# Patient Record
Sex: FEMALE
[De-identification: names, ages, dates, MRNs, and addresses within clinical notes are randomized per-mention and may not be internally consistent; named-entity substitution may affect disease eponyms.]

---

## 2018-04-16 ENCOUNTER — HOSPITAL ENCOUNTER (INPATIENT)
Dept: HOSPITAL 5 - ED | Age: 63
LOS: 2 days | Discharge: OTHER INSTITUTION | DRG: 871 | End: 2018-04-18
Attending: INTERNAL MEDICINE | Admitting: INTERNAL MEDICINE
Payer: MEDICAID

## 2018-04-16 DIAGNOSIS — I48.91: ICD-10-CM

## 2018-04-16 DIAGNOSIS — I50.9: ICD-10-CM

## 2018-04-16 DIAGNOSIS — A41.9: Primary | ICD-10-CM

## 2018-04-16 DIAGNOSIS — L02.211: ICD-10-CM

## 2018-04-16 DIAGNOSIS — E66.01: ICD-10-CM

## 2018-04-16 DIAGNOSIS — E87.1: ICD-10-CM

## 2018-04-16 DIAGNOSIS — I11.0: ICD-10-CM

## 2018-04-16 DIAGNOSIS — N39.0: ICD-10-CM

## 2018-04-16 DIAGNOSIS — D64.9: ICD-10-CM

## 2018-04-16 DIAGNOSIS — Z85.89: ICD-10-CM

## 2018-04-16 DIAGNOSIS — Z86.14: ICD-10-CM

## 2018-04-16 DIAGNOSIS — R65.21: ICD-10-CM

## 2018-04-16 DIAGNOSIS — Z92.21: ICD-10-CM

## 2018-04-16 DIAGNOSIS — Z92.3: ICD-10-CM
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
## 2018-04-16 DIAGNOSIS — G47.33: ICD-10-CM

## 2018-04-16 PROCEDURE — 81001 URINALYSIS AUTO W/SCOPE: CPT

## 2018-04-16 PROCEDURE — 87116 MYCOBACTERIA CULTURE: CPT

## 2018-04-16 PROCEDURE — 86920 COMPATIBILITY TEST SPIN: CPT

## 2018-04-16 PROCEDURE — 93010 ELECTROCARDIOGRAM REPORT: CPT

## 2018-04-16 PROCEDURE — 82270 OCCULT BLOOD FECES: CPT

## 2018-04-16 PROCEDURE — 80162 ASSAY OF DIGOXIN TOTAL: CPT

## 2018-04-16 PROCEDURE — 71045 X-RAY EXAM CHEST 1 VIEW: CPT

## 2018-04-16 PROCEDURE — 87086 URINE CULTURE/COLONY COUNT: CPT

## 2018-04-16 PROCEDURE — 85025 COMPLETE CBC W/AUTO DIFF WBC: CPT

## 2018-04-16 PROCEDURE — 80053 COMPREHEN METABOLIC PANEL: CPT

## 2018-04-16 PROCEDURE — 85730 THROMBOPLASTIN TIME PARTIAL: CPT

## 2018-04-16 PROCEDURE — 74177 CT ABD & PELVIS W/CONTRAST: CPT

## 2018-04-16 PROCEDURE — 36415 COLL VENOUS BLD VENIPUNCTURE: CPT

## 2018-04-16 PROCEDURE — 82805 BLOOD GASES W/O2 SATURATION: CPT

## 2018-04-16 PROCEDURE — P9016 RBC LEUKOCYTES REDUCED: HCPCS

## 2018-04-16 PROCEDURE — 86403 PARTICLE AGGLUT ANTBDY SCRN: CPT

## 2018-04-16 PROCEDURE — 82962 GLUCOSE BLOOD TEST: CPT

## 2018-04-16 PROCEDURE — 85007 BL SMEAR W/DIFF WBC COUNT: CPT

## 2018-04-16 PROCEDURE — 87324 CLOSTRIDIUM AG IA: CPT

## 2018-04-16 PROCEDURE — 86850 RBC ANTIBODY SCREEN: CPT

## 2018-04-16 PROCEDURE — 80048 BASIC METABOLIC PNL TOTAL CA: CPT

## 2018-04-16 PROCEDURE — 85610 PROTHROMBIN TIME: CPT

## 2018-04-16 PROCEDURE — 87076 CULTURE ANAEROBE IDENT EACH: CPT

## 2018-04-16 PROCEDURE — 87040 BLOOD CULTURE FOR BACTERIA: CPT

## 2018-04-16 PROCEDURE — 86900 BLOOD TYPING SEROLOGIC ABO: CPT

## 2018-04-16 PROCEDURE — 86140 C-REACTIVE PROTEIN: CPT

## 2018-04-16 PROCEDURE — 85027 COMPLETE CBC AUTOMATED: CPT

## 2018-04-16 PROCEDURE — 86901 BLOOD TYPING SEROLOGIC RH(D): CPT

## 2018-04-16 PROCEDURE — 93005 ELECTROCARDIOGRAM TRACING: CPT

## 2018-04-16 PROCEDURE — 87186 SC STD MICRODIL/AGAR DIL: CPT

## 2018-04-16 PROCEDURE — 82140 ASSAY OF AMMONIA: CPT

## 2018-04-16 NOTE — XRAY REPORT
FINAL REPORT



PROCEDURE:  XR CHEST 1V AP



TECHNIQUE:  Chest radiograph anteroposterior view. CPT 11947







HISTORY:  possible Sepsis 



COMPARISON:  No prior studies are available for comparison.



FINDINGS:  

Two AP upright views of the chest were obtained, both images the

patient is rotated to the left. A right jugular central venous

line is in place. The tip projects in the proximal SVC. No

pneumothorax visualized. Right lung appears clear. Left lung is

poorly visualized due to rotation. I cannot exclude retrocardiac

density. The heart is magnified due to projection probably normal

size. No acute bony abnormalities are seen. 



IMPRESSION:  

Central venous line in place as described. No evidence of

pneumothorax.



The left lung is poorly visualized as the patient is rotated. I

cannot exclude retrocardiac density including atelectasis or

infiltrate. Follow-up PA and lateral chest x-ray is suggested.

## 2018-04-17 LAB
ALBUMIN SERPL-MCNC: 2.9 G/DL (ref 3.9–5)
ALT SERPL-CCNC: 13 UNITS/L (ref 7–56)
ANISOCYTOSIS BLD QL SMEAR: (no result)
APTT BLD: 30.5 SEC. (ref 24.2–36.6)
BACTERIA #/AREA URNS HPF: (no result) /HPF
BAND NEUTROPHILS # (MANUAL): 0.6 K/MM3
BILIRUB UR QL STRIP: (no result)
BLOOD UR QL VISUAL: (no result)
BUN SERPL-MCNC: 22 MG/DL (ref 7–17)
BUN/CREAT SERPL: 24 %
CALCIUM SERPL-MCNC: 8.4 MG/DL (ref 8.4–10.2)
HCT VFR BLD CALC: 21.9 % (ref 30.3–42.9)
HEMOLYSIS INDEX: 11
HGB BLD-MCNC: 7.3 GM/DL (ref 10.1–14.3)
INR PPP: 1.15 (ref 0.87–1.13)
INR PPP: 1.15 (ref 0.87–1.13)
MCH RBC QN AUTO: 31 PG (ref 28–32)
MCHC RBC AUTO-ENTMCNC: 33 % (ref 30–34)
MCV RBC AUTO: 95 FL (ref 79–97)
MUCOUS THREADS #/AREA URNS HPF: (no result) /HPF
MYELOCYTES # (MANUAL): 0 K/MM3
PH UR STRIP: 5 [PH] (ref 5–7)
PLATELET # BLD: 285 K/MM3 (ref 140–440)
PROMYELOCYTES # (MANUAL): 0 K/MM3
RBC # BLD AUTO: 2.31 M/MM3 (ref 3.65–5.03)
RBC #/AREA URNS HPF: 18 /HPF (ref 0–6)
TOTAL CELLS COUNTED BLD: 100
UROBILINOGEN UR-MCNC: < 2 MG/DL (ref ?–2)
WBC #/AREA URNS HPF: 48 /HPF (ref 0–6)

## 2018-04-17 RX ADMIN — OXYCODONE AND ACETAMINOPHEN PRN TAB: 5; 325 TABLET ORAL at 21:36

## 2018-04-17 RX ADMIN — VANCOMYCIN HYDROCHLORIDE SCH MLS/HR: 100 INJECTION, POWDER, LYOPHILIZED, FOR SOLUTION INTRAVENOUS at 22:53

## 2018-04-17 RX ADMIN — MEROPENEM SCH MLS/HR: 1 INJECTION, POWDER, FOR SOLUTION INTRAVENOUS at 15:30

## 2018-04-17 RX ADMIN — MEROPENEM SCH MLS/HR: 1 INJECTION, POWDER, FOR SOLUTION INTRAVENOUS at 21:36

## 2018-04-17 RX ADMIN — SODIUM CHLORIDE SCH MLS/HR: 0.9 INJECTION, SOLUTION INTRAVENOUS at 12:12

## 2018-04-17 RX ADMIN — VANCOMYCIN HYDROCHLORIDE SCH MLS/HR: 100 INJECTION, POWDER, LYOPHILIZED, FOR SOLUTION INTRAVENOUS at 12:14

## 2018-04-17 RX ADMIN — SODIUM CHLORIDE SCH MLS/HR: 0.9 INJECTION, SOLUTION INTRAVENOUS at 04:54

## 2018-04-17 NOTE — CONSULTATION
History of Present Illness





- Reason for Consult


Consult date: 04/17/18


sepsis


Requesting physician: VANNESSA ROSALES





- History of Present Illness





62 years old female with history of stage IV endometrial carcinoma with 

fungating mass to the RLQ abdominal wall and right groin. She is status post 

radiation and chemotherapy at [facility]. Patient was initially admitted in Nov 2017 

due to persistent right lower quadrant and drainage.  Biopsy demonstrated stage 

IV adenocarcinoma of the endometrium.  The patient was taken to the operating 

room and had I&D as well as started on palliative radiation and chemotherapy.  

During the prolonged hospital course she developed pneumonia due to ESBL and 

UTI. Of note, at some point she had Cdiff colitis and MRSA infection ? 

location. Patient was appropriately (treated according to medical records.  It 

seems like by 4/10/18 she spiked a fever and blood cultures were sent which 

were negative.  Urine culture grew Klebsiella multidrug resistant it is unclear 

which medication she received for this.  Patient was then discharged to a 

rehabilitation facility. Last year, she was found to have a right lower 

quadrant abdominal wall abscess back in March 2017.  She underwent I&D in 

August 2017 with a wound VAC placement.  She then had a fall and experienced 

left distal femoral fracture status post ORIF.  Right upper quadrant/groin 

wound remained draining and she was admitted to [facility] in November 2017.    

Unfortunately, she was sent to the ED on 4/16/18 due to fever and tachycardia 

and nonhealing pelvic wound that straining pus.  Of note patient has tunneled 

right neck PICC for over 30 days.





In the ED, initial temperature was 103, heart rate 156, blood pressure 132/65.  

Initial white count 11.  Hemoglobin 7.3.  Platelets 285.  Creatinine 0.9.  

Urinalysis is moderate LE white blood cells 48.  Chest x-ray was rotated.  CT 

of the abdomen showed right lower lobe abdominal wall defect with a multi 

loculated subcutaneous fluid collections on lower abdominal wall and right 

lower quadrant collection with fluid and air levels.  He is to be seen.  

Collections are 13 x 10 cm.





Microbiology:





Blood cultures:


4/10 neg


4/17 GPC in clusters 1 of 4





Urine cultures:


4/10 >100K Klebsiella only sens to meropenem





 


Current Antimicrobials:


Vancomycin 4/17


zosyn 4/17


 





Previous Antimicrobials:











Past History


Past Medical History: other (adenocarcinoma of endometrium stage IV, MRSA 

infection?, C diff colitis, recurrent UTIs, chronic RLQ abdominal wall wound. )


Past Surgical History: Other (RLQ abd wall debridement x multiple. )


Social history: , lives with family.  denies: smoking, alcohol abuse, IV 

drug use


Family history: no significant family history





Medications and Allergies


 Allergies











Allergy/AdvReac Type Severity Reaction Status Date / Time


 


No Known Allergies Allergy   Unverified 04/16/18 23:17











Active Meds: 


Active Medications





Acetaminophen (Tylenol)  650 mg PO Q4H PRN


   PRN Reason: For Pain/Fever/Headache


Dextrose (D50w (25gm) Syringe)  50 ml IV PRN PRN


   PRN Reason: Hypoglycemia


Enoxaparin Sodium (Lovenox)  40 mg SUB-Q QDAY@2200 DAVID


Famotidine (Pepcid)  20 mg PO QDAY DAVID


Sodium Chloride (Nacl 0.9% 1000 Ml)  1,000 mls @ 75 mls/hr IV AS DIRECT [facility]


   Last Admin: 04/17/18 12:12 Dose:  100 mls/hr


Piperacillin Sod/Tazobactam Sod (Zosyn/Ns 4.5gm/100ml)  4.5 gm in 100 mls @ 200 

mls/hr IV Q6HR [facility]


   Last Admin: 04/17/18 13:18 Dose:  200 mls/hr


Vancomycin HCl 2,000 mg/ (Sodium Chloride)  520 mls @ 250 mls/hr IV Q12HR [facility]


   Last Infusion: 04/17/18 12:57 Dose:  0 mls/hr


Insulin Human Regular (Humulin R)  0 units SUB-Q ACHS [facility]; Protocol


Ondansetron HCl (Zofran)  4 mg IV Q8H PRN


   PRN Reason: Nausea And Vomiting


Vancomycin HCl (Vancomycin Pharmacy To Dose)  1 each IV PKCONSULT [facility]; Protocol











Review of Systems


All systems: negative (as per HPI.  Rest of 10 point review systems negative)





Physical Examination





- Physical Exam


Narrative exam: 





General appearance: Alert in NAD, conversant 


Eyes: anicteric sclerae, moist conjunctivae; no lid-lag; PERRLA 


HENT: Atraumatic; oropharynx clear with moist mucous membranes and no mucosal 

ulcerations/no oral thrush; normal hard and soft palate. Normal external ears.


Neck: Trachea midline; supple, no thyromegaly or lymphadenopathy 


Lungs: CTA, with normal respiratory effort and no intercostal retractions 


CV: RRR, no murmurs


Abdomen: Soft, non-tender; RLQ / groin 4 wound openings packed no drainage, no 

erythema.


Extremities: No peripheral edema or extremity lymphadenopathy


Skin: Normal temperature, turgor and texture; no rash, ulcers or subcutaneous 

nodules 


Psych: Appropriate affect, alert and oriented to person, place and time. Neuro: 

alert and oriented x 3. Moving all extermities


Lines: right neck tunneled PICC











- Constitutional


Vitals: 


 Vital Signs











Temp Pulse Resp BP Pulse Ox


 


 98.0 F   113 H  19   115/58   98 


 


 04/17/18 12:00  04/17/18 10:30  04/17/18 06:45  04/17/18 10:30  04/17/18 06:45








 Temperature -Last 24 Hours











Temperature                    98.0 F


 


Temperature                    98.0 F


 


Temperature                    98.7 F


 


Temperature                    1003.1 F


 


Temperature                    103.0 F

















Results





- Labs


CBC & Chem 7: 


 04/16/18 23:29





 04/16/18 23:29


Labs: 


 Abnormal lab results











  04/16/18 04/16/18 04/16/18 Range/Units





  01:35 23:29 23:29 


 


RBC   2.31 L   (3.65-5.03)  M/mm3


 


Hgb   7.3 L   (10.1-14.3)  gm/dl


 


Hct   21.9 L   (30.3-42.9)  %


 


RDW   23.9 H   (13.2-15.2)  %


 


Seg Neuts % (Manual)   92.0 H   (40.0-70.0)  %


 


Lymphocytes % (Manual)   0 L   (13.4-35.0)  %


 


Seg Neutrophils # Man   10.1 H   (1.8-7.7)  K/mm3


 


Lymphocytes # (Manual)   0.0 L   (1.2-5.4)  K/mm3


 


PT    15.3 H  (12.2-14.9)  Sec.


 


INR    1.15 H  (0.87-1.13)  


 


VBG pH     (7.320-7.420)  


 


Sodium     (137-145)  mmol/L


 


Chloride     ()  mmol/L


 


BUN     (7-17)  mg/dL


 


Glucose     ()  mg/dL


 


Albumin     (3.9-5)  g/dL


 


Urine WBC (Auto)  48.0 H    (0.0-6.0)  /HPF


 


Digoxin     (0.9-2.0)  ng/mL














  04/16/18 04/16/18 04/17/18 Range/Units





  23:29 23:29 01:47 


 


RBC     (3.65-5.03)  M/mm3


 


Hgb     (10.1-14.3)  gm/dl


 


Hct     (30.3-42.9)  %


 


RDW     (13.2-15.2)  %


 


Seg Neuts % (Manual)     (40.0-70.0)  %


 


Lymphocytes % (Manual)     (13.4-35.0)  %


 


Seg Neutrophils # Man     (1.8-7.7)  K/mm3


 


Lymphocytes # (Manual)     (1.2-5.4)  K/mm3


 


PT     (12.2-14.9)  Sec.


 


INR     (0.87-1.13)  


 


VBG pH   7.449 H   (7.320-7.420)  


 


Sodium  132 L    (137-145)  mmol/L


 


Chloride  92.0 L    ()  mmol/L


 


BUN  22 H    (7-17)  mg/dL


 


Glucose  263 H    ()  mg/dL


 


Albumin  2.9 L    (3.9-5)  g/dL


 


Urine WBC (Auto)     (0.0-6.0)  /HPF


 


Digoxin    0.7 L  (0.9-2.0)  ng/mL














  04/17/18 Range/Units





  03:14 


 


RBC   (3.65-5.03)  M/mm3


 


Hgb   (10.1-14.3)  gm/dl


 


Hct   (30.3-42.9)  %


 


RDW   (13.2-15.2)  %


 


Seg Neuts % (Manual)   (40.0-70.0)  %


 


Lymphocytes % (Manual)   (13.4-35.0)  %


 


Seg Neutrophils # Man   (1.8-7.7)  K/mm3


 


Lymphocytes # (Manual)   (1.2-5.4)  K/mm3


 


PT  15.3 H  (12.2-14.9)  Sec.


 


INR  1.15 H  (0.87-1.13)  


 


VBG pH   (7.320-7.420)  


 


Sodium   (137-145)  mmol/L


 


Chloride   ()  mmol/L


 


BUN   (7-17)  mg/dL


 


Glucose   ()  mg/dL


 


Albumin   (3.9-5)  g/dL


 


Urine WBC (Auto)   (0.0-6.0)  /HPF


 


Digoxin   (0.9-2.0)  ng/mL














Assessment and Plan





Assessment: 


1) Sepsis: Present on admission, manifested by fever, tachycardia,  

leukocytosis.  Etiology most likely GPC bacteremia +/- pelvic abscesses +/- UTI.


2) Pelvic multi-loculated abscesses: from metastatic necrotic adenocarcinoma of 

endometrium 


   -CT of the abdomen showed right lower lobe abdominal wall defect with a 

multi loculated subcutaneous fluid collections on lower abdominal wall and 

right lower quadrant collection with fluid and air levels.  He is to be seen.  

Collections are 13 x 10 cm.


3) GPC in clusters bacteremia: real vs. contaminant ? source PICC 


   -Blood cx 4/16 1 of 4 positive for GPC 


4) Recent MDR Klebsiella UTI


5) Stage IV endometrial carcinoma with fungating mass to the RLQ abdominal wall 

and right groin. She is status post radiation and chemotherapy at [facility].


6) Recent pneumonia due to ESBL


7) Recurrent UTI


8) History of Cdiff colitis 


9) History of MRSA infection ? location. 


10) Tunneled right neck PICC for over 30 days.


11) DM


12) Morbid obesity





Plan:


-follow-up blood cultures, urine culture


-obtain TTE


-obtain  C-reactive protein (CRP)


-Consider IR-abdominal collection drainage


-stop zosyn


-start meropenem to cover MDR UTI


-continue vancomcyin IV with careful creat monitoring to cover GPC in blood cx


-If blood cx continues to grow > 2 bottles or MRSA then PICC should be removed 


-contact isolation 


-very complex case considering transferring back to [facility]





Thank you for your consultation, will follow up with you. 





Martha Carrington MD


Infectious Diseases Specialist


[facility] ([facility])


M [phone]


O [phone]

## 2018-04-17 NOTE — CAT SCAN REPORT
FINAL REPORT



PROCEDURE:  CT ABDOMEN PELVIS W CON



TECHNIQUE:  Computerized axial tomography of the abdomen and

pelvis was performed after the IV injection of iodinated nonionic

contrast. 



HISTORY:  abd pain/open wound 



COMPARISON:  No prior studies are available for comparison.



FINDINGS:  

Visualized lower thorax: No significant abnormality.



Liver: The liver is enlarged. There is no discrete mass .



Spleen: Normal size and attenuation.



Gallbladder and biliary system: Normal.



Pancreas: Normal.



Adrenals: Normal.



Kidneys: There are small kidney cysts. There are no kidney

stones. There is no hydronephrosis..



GI tract: There is no bowel obstruction, colitis or enteritis.

The appendix is not discretely visible..



Lymph nodes and mesentery: Normal. 



Vasculature: There is calcified plaque in the abdominal aorta.

There is no aneurysm..



Bladder: Normal.



Reproductive organs: Normal.



Peritoneum: There is a low-density mass in the right inguinal

region measuring 3.9 centimeters. This could be an enlarged

pelvic lymph node or possibly early abscess. Malignancy cannot be

excluded..



Musculoskeletal structures: No significant abnormality.



Other: There is a defect in the right lower quadrant anterior

abdominal wall. There are multiloculated subcutaneous fluid

collections of the lower anterior abdominal wall and right lower

quadrant anterior abdominal wall containing contrast, fluid and

air consistent with multiple abscesses. There is no demonstrated

fistula to intra-abdominal structures including bowel. 



Maximum overall dimensions of the complex abscess or multiple

abscesses is 13 x 10 centimeters. 



IMPRESSION:  

There is a defect in the right lower quadrant anterior abdominal

wall. There are multiloculated subcutaneous fluid collections of

the lower anterior abdominal wall and right lower quadrant

anterior abdominal wall containing contrast, fluid and air

consistent with multiple abscesses. There is no demonstrated

fistula to intra-abdominal structures including bowel. 



Maximum overall dimensions of the complex abscess or multiple

abscesses is 13 x 10 centimeters. 



There is a low-density intraperitoneal mass or fluid collection

in the right inguinal region measuring 3.9 centimeters. This

could be an enlarged pelvic lymph node or possibly early abscess.

There does not appear to be any obvious direct connection to the

subcutaneous abscesses. Malignancy cannot be excluded..



The liver is enlarged. There is no discrete mass.



There are small kidney cysts. There are no kidney stones. There

is no hydronephrosis..



There is no bowel obstruction, colitis or enteritis. The appendix

is not discretely visible

## 2018-04-17 NOTE — EMERGENCY DEPARTMENT REPORT
ED General Adult HPI





- General


Chief complaint: Weakness


Stated complaint: ANEMIA


Time Seen by Provider: 04/16/18 23:28


Source: EMS


Mode of arrival: Stretcher


Limitations: Physical Limitation





- History of Present Illness


Initial comments: 





Nursing home patient code sepsis here with fever and tachycardia nonhealing 

pelvic wound that straining pus awake and alert and oriented with Dr. Dwight bettencourt heart rate patient is with RVR patient aftib state.  bBlockers with dig 

for a fib, also on warfarin.with tachycardia rapid A. fib sepsis we'll try any 

abdominal contact precautions for history of MRSA.  She also has diarrhea 

possible C. difficile., abx adn cultures, ordered.


-: Gradual, unknown


Location: abdomen


Severity scale (0 -10): 1


Quality: burning


Associated Symptoms: malaise, weakness.  denies: confusion, diaphoresis, nausea/

vomiting, seizure, shortness of breath, syncope





- Related Data


 Allergies











Allergy/AdvReac Type Severity Reaction Status Date / Time


 


No Known Allergies Allergy   Unverified 04/16/18 23:17














ED Review of Systems


ROS: 


Stated complaint: ANEMIA


Other details as noted in HPI





Comment: All other systems reviewed and negative


Constitutional: diaphoresis, fever, malaise


ENT: denies: dental pain, hearing loss, epistaxis


Respiratory: denies: shortness of breath, SOB with exertion, SOB at rest, 

stridor


Cardiovascular: palpitations


Gastrointestinal: abdominal pain.  denies: vomiting, hematemesis, melena, 

hematochezia


Musculoskeletal: denies: joint swelling, arthralgia


Neurological: denies: numbness, paresthesias, confusion


Psychiatric: denies: auditory hallucinations, visual hallucinations, homicidal 

thoughts


Hematological/Lymphatic: denies: easy bruising





ED Past Medical Hx





- Past Medical History


Previous Medical History?: Yes


Hx Hypertension: Yes


Hx Congestive Heart Failure: Yes


Hx Diabetes: Yes


Additional medical history: narcolepsy, MRSA, CA, AF, Ortho injury





- Surgical History


Past Surgical History?: No





- Social History


Smoking Status: Never Smoker


Substance Use Type: None





ED Physical Exam





- General


Limitations: Physical Limitation


General appearance: alert, obese, other (chronically ill-appearing)





- Head


Head exam: Present: atraumatic, normocephalic





- Eye


Eye exam: Present: normal appearance, PERRL, EOMI





- ENT


ENT exam: Present: normal exam, normal orophraynx





- Neck


Neck exam: Present: normal inspection.  Absent: tenderness, meningismus





- Respiratory


Respiratory exam: Present: normal lung sounds bilaterally.  Absent: rales, 

rhonchi, stridor





- Cardiovascular


Cardiovascular Exam: Present: tachycardia, irregular rhythm





- GI/Abdominal


GI/Abdominal exam: Present: soft, other (draining pelvic wound).  Absent: mass





- 


External exam: Absent: ecchymosis, bleeding





- Extremities Exam


Extremities exam: Present: other (capillary refill about 2 seconds).  Absent: 

calf tenderness





- Neurological Exam


Neurological exam: Present: alert, CN II-XII intact.  Absent: motor sensory 

deficit





- Skin


Skin exam: Present: erythema





ED Course


 Vital Signs











  04/16/18 04/16/18 04/16/18





  22:59 23:06 23:15


 


Temperature 103.0 F H  


 


Pulse Rate 156 H 156 H 152 H


 


Respiratory 18 14 25 H





Rate   


 


Blood Pressure 132/65  132/65


 


O2 Sat by Pulse 96  96





Oximetry   














  04/16/18 04/16/18 04/17/18





  23:31 23:45 00:01


 


Temperature   


 


Pulse Rate 143 H 133 H 138 H


 


Respiratory 19 21 30 H





Rate   


 


Blood Pressure 123/53 118/60 129/55


 


O2 Sat by Pulse 97 100 98





Oximetry   














  04/17/18 04/17/18 04/17/18





  00:13 00:15 00:16


 


Temperature   1003.1 F H


 


Pulse Rate 124 H 125 H 


 


Respiratory 20 14 





Rate   


 


Blood Pressure 129/55 118/61 


 


O2 Sat by Pulse 100 99 





Oximetry   














  04/17/18





  00:30


 


Temperature 


 


Pulse Rate 139 H


 


Respiratory 11 L





Rate 


 


Blood Pressure 119/52


 


O2 Sat by Pulse 95





Oximetry 














ED Medical Decision Making





- Lab Data


Result diagrams: 


 04/16/18 23:29





 04/16/18 23:29





- EKG Data


-: EKG Interpreted by Me


Rate: tachycardia





- EKG Data





04/17/18 02:45


Rapid A. fib with no acute ischemic change





- Radiology Data


Radiology results: report reviewed





- Medical Decision Making





Patient was given IV fluids she was also given antibiotics and cultured she 

does seem to have a draining pelvic abscess and she will get a CAT scan.  She 

has a history of MRSA she also has developed peripheral contact precautions for 

the above.  Case was discussed with the hospitalist and Dr. xaio of 

cardiology will place patient is icu given the rapid heart rate she'll be 

admitted to ICU and to discuss case with the hospitalist and the intensivist dr pulido repeat blood pressure was stable heart rate was better control 130 patient 

will be admitted for eval sepsis, lactic acid is pending


Critical Care Time: Yes


Critical care time in (mins) excluding proc time.: 45


Critical care attestation.: 


If time is entered above; I have spent that time in minutes in the direct care 

of this critically ill patient, excluding procedure time.








ED Disposition


Clinical Impression: 


 Rapid atrial fibrillation, Sepsis





Disposition: [id number] OP ADMIT IP TO THIS HOSP


Is pt being admited?: Yes


Condition: Stable


Referrals: 


JANY MERRITT MD [Primary Care Provider] - 3-5 Days


Time of Disposition: 02:49

## 2018-04-17 NOTE — CONSULTATION
History of Present Illness


Consult date: 04/17/18


Chief complaint: 





low blood count





- History of present illness


History of present illness: 





63 yo F with endometrial cancer presented to hospital from inpatient rehab for 

evaluation of anemia. She states she was sent for a blood transfusion. She has 

been undergoing radiation and chemotherapy for cancer. She states her 6 weeks 

of radiation just finished and she is treated at [facility]. The patient has no other 

complaints. She was admitted to the ICU secondary to Afib RVR. She has a known 

hx of Afib and is on warfarin. She does not know the last time she took the 

medication. Surgery is consulted for findings on CT A/P of abscesses in the 

abdominal wall. The patient states that she had one "abscess drained at [facility] several years ago but it was found to be cancer. Since then multiple 

skin sinus tracts have opened up in her lower abdomen and have become 

nonhealing wounds. She states she had a large bulge in her right lower abdomen/

groin area that extended to her leg which has significantly reduced in size 

since radiation therapy and did not drain. She states her current open wounds 

drain light serous/yellow fluid and no longer drain blood or pus. She denies f/c

, cp, sob, palpitations, abd pain, n/v.





Past History


Past Medical History: atrial fib, other (endometrial ca)


Past Surgical History: Other (drainage of right lower subcutaneous abscess, 

PICC line)


Social history: no significant social history


Family history: no significant family history





Medications and Allergies


 Allergies











Allergy/AdvReac Type Severity Reaction Status Date / Time


 


No Known Allergies Allergy   Unverified 04/16/18 23:17











Active Meds: 


Active Medications





Acetaminophen (Tylenol)  650 mg PO Q4H PRN


   PRN Reason: For Pain/Fever/Headache


Digoxin (Lanoxin)  0.25 mg IV ONCE NR


   Stop: 04/17/18 12:00


Sodium Chloride (Nacl 0.9% 1000 Ml)  1,000 mls @ 75 mls/hr IV AS DIRECT DAVID


   Last Admin: 04/17/18 04:54 Dose:  75 mls/hr


Piperacillin Sod/Tazobactam Sod (Zosyn/Ns 4.5gm/100ml)  4.5 gm in 100 mls @ 200 

mls/hr IV Q8HR DAVID


Ondansetron HCl (Zofran)  4 mg IV Q8H PRN


   PRN Reason: Nausea And Vomiting


Vancomycin HCl (Vancomycin Pharmacy To Dose)  1 each IV PKCONSULT DAVID; Protocol











Review of Systems


All systems: negative (10 point ROS performed and negative except for that 

listed in HPI)





Exam


 Vital Signs











Temp Pulse Resp BP Pulse Ox


 


 103.0 F H  156 H  18   132/65   96 


 


 04/16/18 22:59  04/16/18 22:59  04/16/18 22:59  04/16/18 22:59  04/16/18 22:59











Narrative exam: 





Gen: AAOx3. NAD


ENT: no scleral icterus or conjunctival pallor


CV: S1, s2+. tachy


resp:even and unlabored


Abd: soft, obese, NT, ND. R lower abdominal open wounds under pannus. 4 open 

wounds with packing in place and seropurulent discharge. Packing removed from 

all wounds. Wound probed with cotton tip applicator and largest wound is 

approximately 7-8 cm deep. No active drainage. R lateral groin area with 4 cm 

fluctuant and erythematous area. No drainage.


Ext: no c/c/e. 





Results





- Labs





 04/16/18 23:29





 04/16/18 23:29


 Abnormal lab results











  04/16/18 04/16/18 04/16/18 Range/Units





  01:35 23:29 23:29 


 


RBC   2.31 L   (3.65-5.03)  M/mm3


 


Hgb   7.3 L   (10.1-14.3)  gm/dl


 


Hct   21.9 L   (30.3-42.9)  %


 


RDW   23.9 H   (13.2-15.2)  %


 


Seg Neuts % (Manual)   92.0 H   (40.0-70.0)  %


 


Lymphocytes % (Manual)   0 L   (13.4-35.0)  %


 


Seg Neutrophils # Man   10.1 H   (1.8-7.7)  K/mm3


 


Lymphocytes # (Manual)   0.0 L   (1.2-5.4)  K/mm3


 


PT    15.3 H  (12.2-14.9)  Sec.


 


INR    1.15 H  (0.87-1.13)  


 


VBG pH     (7.320-7.420)  


 


Sodium     (137-145)  mmol/L


 


Chloride     ()  mmol/L


 


BUN     (7-17)  mg/dL


 


Glucose     ()  mg/dL


 


Albumin     (3.9-5)  g/dL


 


Urine WBC (Auto)  48.0 H    (0.0-6.0)  /HPF


 


Digoxin     (0.9-2.0)  ng/mL














  04/16/18 04/16/18 04/17/18 Range/Units





  23:29 23:29 01:47 


 


RBC     (3.65-5.03)  M/mm3


 


Hgb     (10.1-14.3)  gm/dl


 


Hct     (30.3-42.9)  %


 


RDW     (13.2-15.2)  %


 


Seg Neuts % (Manual)     (40.0-70.0)  %


 


Lymphocytes % (Manual)     (13.4-35.0)  %


 


Seg Neutrophils # Man     (1.8-7.7)  K/mm3


 


Lymphocytes # (Manual)     (1.2-5.4)  K/mm3


 


PT     (12.2-14.9)  Sec.


 


INR     (0.87-1.13)  


 


VBG pH   7.449 H   (7.320-7.420)  


 


Sodium  132 L    (137-145)  mmol/L


 


Chloride  92.0 L    ()  mmol/L


 


BUN  22 H    (7-17)  mg/dL


 


Glucose  263 H    ()  mg/dL


 


Albumin  2.9 L    (3.9-5)  g/dL


 


Urine WBC (Auto)     (0.0-6.0)  /HPF


 


Digoxin    0.7 L  (0.9-2.0)  ng/mL














  04/17/18 Range/Units





  03:14 


 


RBC   (3.65-5.03)  M/mm3


 


Hgb   (10.1-14.3)  gm/dl


 


Hct   (30.3-42.9)  %


 


RDW   (13.2-15.2)  %


 


Seg Neuts % (Manual)   (40.0-70.0)  %


 


Lymphocytes % (Manual)   (13.4-35.0)  %


 


Seg Neutrophils # Man   (1.8-7.7)  K/mm3


 


Lymphocytes # (Manual)   (1.2-5.4)  K/mm3


 


PT  15.3 H  (12.2-14.9)  Sec.


 


INR  1.15 H  (0.87-1.13)  


 


VBG pH   (7.320-7.420)  


 


Sodium   (137-145)  mmol/L


 


Chloride   ()  mmol/L


 


BUN   (7-17)  mg/dL


 


Glucose   ()  mg/dL


 


Albumin   (3.9-5)  g/dL


 


Urine WBC (Auto)   (0.0-6.0)  /HPF


 


Digoxin   (0.9-2.0)  ng/mL








 Diabetes panel











  04/16/18 Range/Units





  23:29 


 


Sodium  132 L  (137-145)  mmol/L


 


Potassium  4.0  (3.6-5.0)  mmol/L


 


Chloride  92.0 L  ()  mmol/L


 


Carbon Dioxide  25  (22-30)  mmol/L


 


BUN  22 H  (7-17)  mg/dL


 


Creatinine  0.9  (0.7-1.2)  mg/dL


 


Glucose  263 H  ()  mg/dL


 


Calcium  8.4  (8.4-10.2)  mg/dL


 


AST  20  (5-40)  units/L


 


ALT  13  (7-56)  units/L


 


Alkaline Phosphatase  54  ()  units/L


 


Total Protein  6.8  (6.3-8.2)  g/dL


 


Albumin  2.9 L  (3.9-5)  g/dL








 Calcium panel











  04/16/18 Range/Units





  23:29 


 


Calcium  8.4  (8.4-10.2)  mg/dL


 


Albumin  2.9 L  (3.9-5)  g/dL








 Pituitary panel











  04/16/18 Range/Units





  23:29 


 


Sodium  132 L  (137-145)  mmol/L


 


Potassium  4.0  (3.6-5.0)  mmol/L


 


Chloride  92.0 L  ()  mmol/L


 


Carbon Dioxide  25  (22-30)  mmol/L


 


BUN  22 H  (7-17)  mg/dL


 


Creatinine  0.9  (0.7-1.2)  mg/dL


 


Glucose  263 H  ()  mg/dL


 


Calcium  8.4  (8.4-10.2)  mg/dL








 Adrenal panel











  04/16/18 Range/Units





  23:29 


 


Sodium  132 L  (137-145)  mmol/L


 


Potassium  4.0  (3.6-5.0)  mmol/L


 


Chloride  92.0 L  ()  mmol/L


 


Carbon Dioxide  25  (22-30)  mmol/L


 


BUN  22 H  (7-17)  mg/dL


 


Creatinine  0.9  (0.7-1.2)  mg/dL


 


Glucose  263 H  ()  mg/dL


 


Calcium  8.4  (8.4-10.2)  mg/dL


 


Total Bilirubin  0.30  (0.1-1.2)  mg/dL


 


AST  20  (5-40)  units/L


 


ALT  13  (7-56)  units/L


 


Alkaline Phosphatase  54  ()  units/L


 


Total Protein  6.8  (6.3-8.2)  g/dL


 


Albumin  2.9 L  (3.9-5)  g/dL














- Imaging


CT scan - abdomen: report reviewed, image reviewed


CT scan - pelvis: report reviewed, image reviewed





Assessment and Plan





63 yo F with 





1. sepsis 


2. abdominal wall abscess vs necrotic lymph nodes


3. chronic abdominal wall wound


4. hx of endometrial ca


5. Afib RVR





Ct A/P Reviewed with in house radiologist. Abdominal wall collections do not 

appear to have connection to intraabdominal structures. May likely be necrotic 

lymph nodes 





Plan:





1. continue IV abx


2. wound care consult for open wound


3. obtain wound cultures


4. hold warfarin


5. will follow with you. If patient has persistent fevers despite abx and wound 

care, will consider for surgical drainage. I discussed this with the patient. 

She is aware that incision and drainage of these collections will likely result 

in more open sinus tracts. She understands.


6. ID recs appreciated - IR drainage is an alternative to open drainage of the 

collections.





D/W Dr. Iqbal

## 2018-04-18 VITALS — DIASTOLIC BLOOD PRESSURE: 70 MMHG | SYSTOLIC BLOOD PRESSURE: 135 MMHG

## 2018-04-18 LAB
BUN SERPL-MCNC: 22 MG/DL (ref 7–17)
BUN/CREAT SERPL: 28 %
CALCIUM SERPL-MCNC: 8.2 MG/DL (ref 8.4–10.2)
HCT VFR BLD CALC: 17.3 % (ref 30.3–42.9)
HEMOLYSIS INDEX: 10
HGB BLD-MCNC: 5.9 GM/DL (ref 10.1–14.3)
MCH RBC QN AUTO: 32 PG (ref 28–32)
MCHC RBC AUTO-ENTMCNC: 34 % (ref 30–34)
MCV RBC AUTO: 94 FL (ref 79–97)
PLATELET # BLD: 206 K/MM3 (ref 140–440)
RBC # BLD AUTO: 1.85 M/MM3 (ref 3.65–5.03)

## 2018-04-18 PROCEDURE — 30233N1 TRANSFUSION OF NONAUTOLOGOUS RED BLOOD CELLS INTO PERIPHERAL VEIN, PERCUTANEOUS APPROACH: ICD-10-PCS | Performed by: EMERGENCY MEDICINE

## 2018-04-18 RX ADMIN — OXYCODONE AND ACETAMINOPHEN PRN TAB: 5; 325 TABLET ORAL at 13:52

## 2018-04-18 RX ADMIN — MEROPENEM SCH MLS/HR: 1 INJECTION, POWDER, FOR SOLUTION INTRAVENOUS at 05:06

## 2018-04-18 RX ADMIN — SODIUM CHLORIDE SCH MLS/HR: 0.9 INJECTION, SOLUTION INTRAVENOUS at 04:45

## 2018-04-18 RX ADMIN — VANCOMYCIN HYDROCHLORIDE SCH MLS/HR: 100 INJECTION, POWDER, LYOPHILIZED, FOR SOLUTION INTRAVENOUS at 13:53

## 2018-04-18 NOTE — PROGRESS NOTE
Assessment and Plan





63 yo F with 





1. sepsis 


2. abdominal wall abscess vs necrotic lymph nodes


3. chronic abdominal wall wounds


4. hx of endometrial ca


5. Afib RVR


6. anemia





Plan:





1. continue IV abx


2. dressing changes per wound care


3. f/u wound cultures


4. hold warfarin


5. transfuse prn


6. recommend [place] [facility] the patient's physicians and cancer 

care is established. I discussed this with the patient as a possibility and she 

is amenable. 





Will follow with you. Thank you for this consultation, please call with 

questions or concerns.





Subjective


Date of service: 04/18/18


Narrative: 





Pt seen and examined. Feels slightly better today. Receiving PRBC transfusion. 

No cp, sob, n/v, abd pain. Tmax 102.4 yesterday. 





Objective


 Vital Signs - 12hr











  04/17/18 04/17/18 04/17/18





  22:50 22:58 23:00


 


Temperature   


 


Pulse Rate 110 H 109 H 111 H


 


Pulse Rate [   





From Monitor]   


 


Respiratory 25 H 28 H 27 H





Rate   


 


Blood Pressure 119/51 119/51 100/40


 


O2 Sat by Pulse 94 93 93





Oximetry   














  04/17/18 04/17/18 04/17/18





  23:03 23:11 23:15


 


Temperature   


 


Pulse Rate 115 H 108 H 111 H


 


Pulse Rate [   





From Monitor]   


 


Respiratory 20 28 H 29 H





Rate   


 


Blood Pressure 100/40 100/40 100/40


 


O2 Sat by Pulse 92 92 93





Oximetry   














  04/17/18 04/17/18 04/17/18





  23:20 23:21 23:30


 


Temperature 99.8 F H  


 


Pulse Rate  101 H 100 H


 


Pulse Rate [   





From Monitor]   


 


Respiratory  23 23





Rate   


 


Blood Pressure  100/40 100/40


 


O2 Sat by Pulse  94 93





Oximetry   














  04/17/18 04/17/18 04/18/18





  23:40 23:50 00:00


 


Temperature   99.8 F H


 


Pulse Rate 104 H 85 97 H


 


Pulse Rate [   103 H





From Monitor]   


 


Respiratory 20 21 22





Rate   


 


Blood Pressure 100/40 100/40 102/54


 


O2 Sat by Pulse 94 96 94





Oximetry   














  04/18/18 04/18/18 04/18/18





  00:10 00:20 00:30


 


Temperature   


 


Pulse Rate 91 H 104 H 91 H


 


Pulse Rate [   





From Monitor]   


 


Respiratory 22 21 22





Rate   


 


Blood Pressure 102/54 102/54 102/54


 


O2 Sat by Pulse 95 96 97





Oximetry   














  04/18/18 04/18/18 04/18/18





  00:40 00:50 01:00


 


Temperature   


 


Pulse Rate 84 95 H 97 H


 


Pulse Rate [   





From Monitor]   


 


Respiratory 21 21 20





Rate   


 


Blood Pressure 102/54 102/54 98/51


 


O2 Sat by Pulse 96 96 96





Oximetry   














  04/18/18 04/18/18 04/18/18





  01:10 01:20 01:30


 


Temperature   


 


Pulse Rate 94 H 100 H 99 H


 


Pulse Rate [   





From Monitor]   


 


Respiratory 20 20 21





Rate   


 


Blood Pressure 98/51 98/51 102/54


 


O2 Sat by Pulse 97 97 97





Oximetry   














  04/18/18 04/18/18 04/18/18





  01:40 01:50 02:00


 


Temperature   


 


Pulse Rate 87 112 H 106 H


 


Pulse Rate [   106 H





From Monitor]   


 


Respiratory 19 19 12





Rate   


 


Blood Pressure 102/54 102/54 116/62


 


O2 Sat by Pulse 96 94 86





Oximetry   














  04/18/18 04/18/18 04/18/18





  02:10 02:20 02:30


 


Temperature   


 


Pulse Rate 85 84 92 H


 


Pulse Rate [   





From Monitor]   


 


Respiratory 18 17 17





Rate   


 


Blood Pressure 116/62 116/62 116/62


 


O2 Sat by Pulse 86 89 89





Oximetry   














  04/18/18 04/18/18 04/18/18





  02:40 02:50 03:00


 


Temperature   


 


Pulse Rate 85 85 89


 


Pulse Rate [   





From Monitor]   


 


Respiratory 16 17 16





Rate   


 


Blood Pressure 116/62 116/62 107/60


 


O2 Sat by Pulse 88 89 91





Oximetry   














  04/18/18 04/18/18 04/18/18





  03:10 03:20 03:30


 


Temperature   


 


Pulse Rate 101 H 89 85


 


Pulse Rate [   





From Monitor]   


 


Respiratory 19 17 19





Rate   


 


Blood Pressure 107/60 107/60 107/60


 


O2 Sat by Pulse 78 L 86 91





Oximetry   














  04/18/18 04/18/18 04/18/18





  03:40 03:45 03:50


 


Temperature  97.3 F L 


 


Pulse Rate 90  81


 


Pulse Rate [   





From Monitor]   


 


Respiratory 19  14





Rate   


 


Blood Pressure 107/60  107/60


 


O2 Sat by Pulse 90  87





Oximetry   














  04/18/18 04/18/18 04/18/18





  04:00 04:10 04:20


 


Temperature  97.6 F 


 


Pulse Rate 86 92 H 86


 


Pulse Rate [ 103 H  





From Monitor]   


 


Respiratory 17 15 20





Rate   


 


Blood Pressure 107/60 116/58 116/58


 


O2 Sat by Pulse 88 87 88





Oximetry   














  04/18/18 04/18/18 04/18/18





  04:30 04:40 04:50


 


Temperature   


 


Pulse Rate 85 81 77


 


Pulse Rate [   





From Monitor]   


 


Respiratory 18 18 16





Rate   


 


Blood Pressure 116/58 116/58 116/58


 


O2 Sat by Pulse 82 L 92 90





Oximetry   














  04/18/18 04/18/18 04/18/18





  05:01 05:10 05:20


 


Temperature   


 


Pulse Rate 84 80 79


 


Pulse Rate [   





From Monitor]   


 


Respiratory 17 18 15





Rate   


 


Blood Pressure 109/58 109/58 109/58


 


O2 Sat by Pulse 91 92 91





Oximetry   














  04/18/18 04/18/18 04/18/18





  05:30 05:40 05:50


 


Temperature   


 


Pulse Rate 78 87 88


 


Pulse Rate [   





From Monitor]   


 


Respiratory 18 17 17





Rate   


 


Blood Pressure 109/58 109/58 109/58


 


O2 Sat by Pulse 89 90 89





Oximetry   














  04/18/18 04/18/18 04/18/18





  06:00 06:10 06:20


 


Temperature   


 


Pulse Rate 71 76 81


 


Pulse Rate [ 103 H  





From Monitor]   


 


Respiratory 16 15 17





Rate   


 


Blood Pressure 103/55 103/55 103/55


 


O2 Sat by Pulse 93 88 92





Oximetry   














  04/18/18 04/18/18 04/18/18





  06:30 06:40 06:50


 


Temperature   


 


Pulse Rate 80 78 84


 


Pulse Rate [   





From Monitor]   


 


Respiratory 17 18 16





Rate   


 


Blood Pressure 103/55 103/55 103/55


 


O2 Sat by Pulse 91 91 93





Oximetry   














  04/18/18 04/18/18 04/18/18





  07:00 07:10 07:20


 


Temperature   


 


Pulse Rate 81 78 87


 


Pulse Rate [   





From Monitor]   


 


Respiratory 16 18 16





Rate   


 


Blood Pressure 111/64 111/64 111/64


 


O2 Sat by Pulse 93 91 94





Oximetry   














  04/18/18 04/18/18 04/18/18





  07:30 07:40 07:50


 


Temperature   


 


Pulse Rate 91 H 106 H 96 H


 


Pulse Rate [   





From Monitor]   


 


Respiratory 17 10 L 17





Rate   


 


Blood Pressure 111/64 111/64 111/64


 


O2 Sat by Pulse 90 96 94





Oximetry   














  04/18/18 04/18/18 04/18/18





  08:00 08:10 08:20


 


Temperature 98 F  


 


Pulse Rate 102 H 84 89


 


Pulse Rate [   





From Monitor]   


 


Respiratory 15 19 16





Rate   


 


Blood Pressure 123/69 123/69 123/69


 


O2 Sat by Pulse 95 96 95





Oximetry   














  04/18/18 04/18/18 04/18/18





  08:30 08:40 08:50


 


Temperature   


 


Pulse Rate 101 H 99 H 99 H


 


Pulse Rate [   





From Monitor]   


 


Respiratory 26 H 17 17





Rate   


 


Blood Pressure 123/69 123/69 123/69


 


O2 Sat by Pulse 94 94 94





Oximetry   














  04/18/18 04/18/18 04/18/18





  09:00 09:10 09:20


 


Temperature   


 


Pulse Rate 115 H 112 H 126 H


 


Pulse Rate [   





From Monitor]   


 


Respiratory 18 24 21





Rate   


 


Blood Pressure 136/76 136/76 132/78


 


O2 Sat by Pulse 90 88 88





Oximetry   














  04/18/18 04/18/18





  09:30 09:40


 


Temperature  


 


Pulse Rate 100 H 90


 


Pulse Rate [  





From Monitor]  


 


Respiratory 19 35 H





Rate  


 


Blood Pressure 132/78 119/74


 


O2 Sat by Pulse 92 90





Oximetry  














- General physical appearance


Narrative Exam: 





Gen: AAOx3. NAD


CV: S1, s2+


resp:even and unlabored


Abd: soft, obese, NT, ND. R lower abdominal open wounds under pannus. Wounds 

packed and covered with abd pads.  R lateral groin area with 4 cm fluctuant and 

erythematous area, unchanged. No drainage.


Ext: no c/c/e. 








- Labs





 04/18/18 04:52





 04/18/18 04:52


 Diabetes panel











  04/18/18 Range/Units





  04:52 


 


Sodium  138  (137-145)  mmol/L


 


Potassium  3.5 L  (3.6-5.0)  mmol/L


 


Chloride  101.1  ()  mmol/L


 


Carbon Dioxide  23  (22-30)  mmol/L


 


BUN  22 H  (7-17)  mg/dL


 


Creatinine  0.8  (0.7-1.2)  mg/dL


 


Glucose  171 H  ()  mg/dL


 


Calcium  8.2 L  (8.4-10.2)  mg/dL








 Calcium panel











  04/18/18 Range/Units





  04:52 


 


Calcium  8.2 L  (8.4-10.2)  mg/dL








 Pituitary panel











  04/18/18 Range/Units





  04:52 


 


Sodium  138  (137-145)  mmol/L


 


Potassium  3.5 L  (3.6-5.0)  mmol/L


 


Chloride  101.1  ()  mmol/L


 


Carbon Dioxide  23  (22-30)  mmol/L


 


BUN  22 H  (7-17)  mg/dL


 


Creatinine  0.8  (0.7-1.2)  mg/dL


 


Glucose  171 H  ()  mg/dL


 


Calcium  8.2 L  (8.4-10.2)  mg/dL








 Adrenal panel











  04/18/18 Range/Units





  04:52 


 


Sodium  138  (137-145)  mmol/L


 


Potassium  3.5 L  (3.6-5.0)  mmol/L


 


Chloride  101.1  ()  mmol/L


 


Carbon Dioxide  23  (22-30)  mmol/L


 


BUN  22 H  (7-17)  mg/dL


 


Creatinine  0.8  (0.7-1.2)  mg/dL


 


Glucose  171 H  ()  mg/dL


 


Calcium  8.2 L  (8.4-10.2)  mg/dL

## 2018-04-18 NOTE — PROGRESS NOTE
Assessment and Plan





Assessment: 


1) Sepsis: still high fever.  Etiology most likely MRSA bacteremia +/- pelvic 

abscesses +/- UTI.


2) Pelvic multi-loculated abscesses: from metastatic necrotic adenocarcinoma of 

endometrium 


   -CT of the abdomen showed right lower lobe abdominal wall defect with a 

multi loculated subcutaneous fluid collections on lower abdominal wall and 

right lower quadrant collection with fluid and air levels.  He is to be seen.  

Collections are 13 x 10 cm.


   -CRP=10.8


3) MRSA bacteremia: likely real from PICC 


   -Blood cx 4/16 1 of 4 positive forMRSA


4) Recent MDR Klebsiella UTI


5) Stage IV endometrial carcinoma with fungating mass to the RLQ abdominal wall 

and right groin. She is status post radiation and chemotherapy at [facility] [place].


6) Recent pneumonia due to ESBL


7) Recurrent UTI


8) History of Cdiff colitis 


9) History of MRSA infection ? location. 


10) Tunneled right neck PICC for over 30 days.


11) DM


12) Morbid obesity





Plan:


-remove PICC line ASAP - order placed


-obtain PIV for now until bacteremia clears


-repeat blood cx tomorrow


-obtain TTE - pending 


-Consider IR-abdominal collection drainage


-continue meropenem to cover MDR UTI


-continue vancomcyin IV with careful creat monitoring to cover MRSA bacteremia


-very complex case considering transferring back to [facility]





Discussed with ICU staff 





Thank you for your consultation, will follow up with you. 





Martha Carrington MD


Infectious Diseases Specialist


[facility] ([facility]


M [phone]


O [phone]





Subjective


Date of service: 04/18/18


Principal diagnosis: staph bacteremia


Interval history: 


Feels ok, still fever 102.4. 





Microbiology:





Blood cultures:


4/10 neg


4/17 MRSA 1 of 4





Urine cultures:


4/10 >100K Klebsiella only sens to meropenem





Wound cx 4/17 





 


Current Antimicrobials:


Vancomycin 4/17


Meropenem 4/17


 





Previous Antimicrobials:


zosyn 4/17





Objective





- Exam


Narrative Exam: 





General appearance: Alert in NAD, conversant 


Eyes: anicteric sclerae, moist conjunctivae; no lid-lag; PERRLA 


HENT: Atraumatic; oropharynx clear with moist mucous membranes and no mucosal 

ulcerations/no oral thrush; normal hard and soft palate. Normal external ears.


Neck: Trachea midline; supple, no thyromegaly or lymphadenopathy 


Lungs: CTA, with normal respiratory effort and no intercostal retractions 


CV: RRR, no murmurs


Abdomen: Soft, non-tender; RLQ / groin 4 wound openings packed no drainage, no 

erythema.


Extremities: No peripheral edema or extremity lymphadenopathy


Skin: Normal temperature, turgor and texture; no rash, ulcers or subcutaneous 

nodules 


Psych: Appropriate affect, alert and oriented to person, place and time. Neuro: 

alert and oriented x 3. Moving all extermities


Lines: right neck tunneled PICC











- Constitutional


Vitals: 


 Vital Signs











Temp Pulse Resp BP Pulse Ox


 


 98 F   98 H  13   149/71   89 


 


 04/18/18 08:00  04/18/18 14:50  04/18/18 14:50  04/18/18 14:50  04/18/18 14:50








 Temperature -Last 24 Hours











Temperature                    98 F


 


Temperature                    97.6 F


 


Temperature                    97.3 F


 


Temperature                    99.8 F


 


Temperature                    99.8 F


 


Temperature                    102.4 F


 


Temperature                    102.4 F

















- Labs


CBC & Chem 7: 


 04/18/18 04:52





 04/18/18 04:52


Labs: 


 Abnormal lab results











  04/17/18 04/18/18 04/18/18 Range/Units





  21:59 04:52 04:52 


 


RBC   1.85 L   (3.65-5.03)  M/mm3


 


Hgb   5.9 L*   (10.1-14.3)  gm/dl


 


Hct   17.3 L*   (30.3-42.9)  %


 


RDW   24.0 H   (13.2-15.2)  %


 


Potassium    3.5 L  (3.6-5.0)  mmol/L


 


BUN    22 H  (7-17)  mg/dL


 


Glucose    171 H  ()  mg/dL


 


POC Glucose  197 H    ()  


 


Calcium    8.2 L  (8.4-10.2)  mg/dL


 


Crossmatch     














  04/18/18 04/18/18 Range/Units





  06:51 07:49 


 


RBC    (3.65-5.03)  M/mm3


 


Hgb    (10.1-14.3)  gm/dl


 


Hct    (30.3-42.9)  %


 


RDW    (13.2-15.2)  %


 


Potassium    (3.6-5.0)  mmol/L


 


BUN    (7-17)  mg/dL


 


Glucose    ()  mg/dL


 


POC Glucose   184 H  ()  


 


Calcium    (8.4-10.2)  mg/dL


 


Crossmatch  See Detail

## 2018-04-18 NOTE — HISTORY AND PHYSICAL REPORT
CHIEF COMPLAINT:  Weakness.



OTHER COMPLAINT:  Include fever and palpitation.



HISTORY OF PRESENT ILLNESS:  The patient is a 62-year-old female brought from

nursing home after she started having fever with palpitation and weakness.  The

patient was noted to have low blood count and was brought to ER for evaluation. 

The patient denied history of chest pain, denied history of shortness of breath

and also denied history of nausea and vomiting.  The patient has had multiple

abdominal wall abscess with nonhealing pelvic wound that is draining some

purulent liquid.  Also, the patient has history of diarrhea.



PAST MEDICAL HISTORY:  Pertinent for hypertension, congestive heart failure,

diabetes mellitus, narcolepsy, methicillin-resistant staphylococcus aureus

infection, atrial fibrillation.  Also, the patient has pass history of abdominal

and pelvic wall abscess.



PAST SURGICAL HISTORY:  Pertinent for drainage and debridement of abdominal wall

abscess.



FAMILY HISTORY:  Noncontributory.



SOCIAL HISTORY:  The patient stays at the nursing home, does not smoke, does not

drink alcohol and does not use illicit drug.



MEDICATIONS:  The patient's home medications are not fully known, but the

patient is on warfarin among other medications.



ALLERGIES:  There are no known drug allergies.



REVIEW OF SYSTEMS:

CONSTITUTIONAL:  There is fever.  There is no diaphoresis.  There are no chills.

HEENT:  There is no headache or sore throat.

CARDIOVASCULAR SYSTEM:  There is no chest pain or orthopnea, but he has

palpitation.

RESPIRATORY:  There is no shortness of breath or cough.

GASTROINTESTINAL:  There is no nausea, no vomiting, no abdominal pain, but there

is diarrhea and no constipation.

NEUROLOGICAL:  There is no numbness, no dizziness, no altered mental status.

MUSCULOSKELETAL:  There is no joint pain or swelling.

DERMATOLOGICAL:  There is draining nonhealing wound in the pelvic area and

adjacent abdominal wall area.

GENITOURINARY:  There is no dysuria, hematuria or flank pain.

Rest of system review is normal.



PHYSICAL EXAMINATION:

GENERAL:  At the time of exam, the patient was found to be alert, oriented x 3,

and not in acute distress.

VITAL SIGNS:  The patient's initial vital signs shows a temperature of 103

degrees Fahrenheit, pulse of 156, respirations 18, blood pressure 132/65, O2 sat

of 96% on room air.

HEENT:  Pupils to be equal, round, reactive to light and accommodating. 

Extraocular muscles are intact.

NECK:  Supple with no JVD or carotid bruit.

CARDIOVASCULAR:  Showed normal first and second heart sounds with rapid rate and

irregularly irregular rhythm.

RESPIRATORY:  Show good air entry on both sides of the lungs with no abnormal

breath sounds.

GASTROINTESTINAL SYSTEM:  Show abdomen to be full, soft, nontender with no

organomegaly or rigidity.

NEUROLOGIC:  Shows no focal deficit.

MUSCULOSKELETAL:  Show no joint swelling or tenderness.

DERMATOLOGICAL:  Show abdominal wall surgical scars with poorly healed wound

with drainage in the pelvic area and adjacent abdominal area.

GENITOURINARY:  Show no costovertebral angle tenderness.



PERTINENT LABORATORY AND IMAGING STUDIES:  The patient had CBC done with normal

white count, low hemoglobin of 7.3 and low hematocrit of 21.9 with normal MCV

with CBC differential of elevated neutrophil count of 92% with no significant

band found.  The patient's coagulation studies show elevated PT of 15.3 with

slightly elevated INR of 1.15 with the patient on Coumadin.  The patient's

chemistry showed low sodium of 132 with low chloride of 92, elevated BUN of 22

with normal creatinine and normal estimated GFR of greater than 60.  The

patient's blood glucose level is high with a value of 263 and the patient has

low albumin level of 2.9 and lactic acid level was normal with a value of 1.6. 

The patient's urinalysis showed moderate urine leukocyte esterase, negative

urine nitrites, high urine WBC of 48 with 3+ bacteria.  The patient's digoxin

level is low with a value of 0.7.



IMAGING STUDIES:  The patient has CT of the abdomen and pelvis done, which

results pending.  The patient also had a chest x-ray done that shows central

venous line in place with no evidence of pneumothorax and the radiologist

commented that the left lung is poorly visualized because of the patient's

rotation; however, the radiologist say that he cannot exclude retrocardiac

density including atelectasis or infiltrate and recommended a PA and lateral

chest x-ray for that.



ASSESSMENT AND PLAN:

1.  Abdominal and pelvic wall abscess.

2.  Sepsis.

3.  Atrial fibrillation with rapid ventricular rate.

4.  Urinary tract infection.

5.  Diabetes mellitus.

6.  Anemia.



PLAN:  The patient will be admitted to critical care unit and will receive

another dose of digoxin 6 hours from the last dose at about 09:25 a.m. this

morning to be able to control the atrial fibrillation with rapid ventricular

rate.  Also, the patient will be on IV normal saline at 75 mL an hour to have

gone through the rapid heart rate and for management of sepsis, the patient will

remain on IV Zosyn 3.375 grams q. 8 hours, on IV metronidazole 500 mg q. 8 hours

for management of urinary tract infection.  The patient is already placed on IV

Zosyn 3.375 grams q. 8 hours and for the management of anemia, the patient will

have CBC in the morning to trend the hemoglobin and hematocrit.  For management

of diabetes mellitus, the patient will be placed on carbohydrate restrictive

diet with 2-gram sodium and the patient will be on Accu-Chek before meals and at

bedtime followed by moderate dose sliding scale using regular insulin coverage. 

The patient will also have wound care nurse consult for management of poorly

healed wound and the patient will be on IV Zofran 4 mg every 6 hours for nausea

and vomiting and Tylenol 650 mg by mouth every 4 hours for fever, headache.  The

patient's home medications also will be reconciled and applied when they are

fully known.





DD: 04/17/2018 04:08

DT: 04/17/2018 04:46

JOB# [id number]  [id number]

OCN/NTS

## 2018-04-18 NOTE — PROGRESS NOTE
Assessment and Plan





- Patient Problems


(1) Sepsis


Status: Acute   


Qualifiers: 


   Sepsis type: methicillin resistant Staphylococcus aureus   Qualified Code(s)

: A41.02 - Sepsis due to Methicillin resistant Staphylococcus aureus   


Plan to address problem: 


MRSA bacteremia 1/4 bottles, GNR in wound cultures


Continue antibiotics...Vancomycin and Meropenem


Document allergy to Vanc is an adverse drug reaction(she developed renal 

failure while on Vancomycin)


Monitor hemodynamics closely


VTE prophylaxis


Wound care








(2) Intra-abdominal abscess


Status: Acute   


Plan to address problem: 


Antibiotics








(3) Rapid atrial fibrillation


Status: Acute   


Plan to address problem: 


Currently rate controlled.


Continue with current medications


VTE prophylaxis, not fully anticoagulated at this time








(4) Morbid obesity


Status: Acute   


Plan to address problem: 


Life style modifications, weight loss as an out patient








(5) Severe anemia


Status: Acute   


Plan to address problem: 


Supportive blood transfusions


Follow iron studies,and if iron deficient may benefit from IV iron therapy to 

minimize the need for blood transfusions








(6) Endometrial cancer, FIGO stage CARLIE


Status: Acute   


Plan to address problem: 


Plan to transfer to [facility] for ongoing care.


Transfer center has been contacted








Subjective


Date of service: 04/18/18


Principal diagnosis: Severe sepsis, atrial fibrillation with RVR, severe anemia


Interval history: 





Luke was seen and examined.


Vitals, labs, medications, chart, imaging reviewed.


24 hour events reviewed.


Patient discussed in ICU Inter-disciplinary rounds.





Had temperature spike of 102.4 at 8pm, no further fevers.


She denies any pain, no cough, no chest pain, no abdominal pain


States her heart skips a beat now and again.


No dysuria,no increased frequency of urination. voiding well


Denies any headaches, no vomiting, but has been nauseous.


No diarrhea.





Objective





- Exam


Narrative Exam: 





General appearance: Alert in NAD, conversant 


Eyes: anicteric sclerae, moist conjunctivae; no lid-lag; PERRLA 


HENT: Atraumatic; oropharynx clear with moist mucous membranes and no mucosal 

ulcerations/no oral thrush; normal hard and soft palate. 


Normal external ears.


Neck: Trachea midline; supple, no thyromegaly or lymphadenopathy 


Lungs: CTA, with normal respiratory effort and no intercostal retractions 


CV: RRR, no murmurs


Abdomen: Soft, non-tender; RLQ / groin 4 wound openings packed no drainage, 

induration,  no erythema.


Extremities: No peripheral edema or extremity lymphadenopathy


Skin: Normal temperature, turgor and texture; no rash, ulcers or subcutaneous 

nodules 


Psych: Appropriate affect, alert and oriented to person, place and time. 


Neuro: alert and oriented x 3. Moving all extremities


Lines: Right chest  tunneled PICC








 Vital Signs - 12hr











  04/18/18 04/18/18 04/18/18





  01:10 01:20 01:30


 


Temperature   


 


Pulse Rate 94 H 100 H 99 H


 


Pulse Rate [   





From Monitor]   


 


Respiratory 20 20 21





Rate   


 


Blood Pressure 98/51 98/51 102/54


 


O2 Sat by Pulse 97 97 97





Oximetry   














  04/18/18 04/18/18 04/18/18





  01:40 01:50 02:00


 


Temperature   


 


Pulse Rate 87 112 H 106 H


 


Pulse Rate [   106 H





From Monitor]   


 


Respiratory 19 19 12





Rate   


 


Blood Pressure 102/54 102/54 116/62


 


O2 Sat by Pulse 96 94 86





Oximetry   














  04/18/18 04/18/18 04/18/18





  02:10 02:20 02:30


 


Temperature   


 


Pulse Rate 85 84 92 H


 


Pulse Rate [   





From Monitor]   


 


Respiratory 18 17 17





Rate   


 


Blood Pressure 116/62 116/62 116/62


 


O2 Sat by Pulse 86 89 89





Oximetry   














  04/18/18 04/18/18 04/18/18





  02:40 02:50 03:00


 


Temperature   


 


Pulse Rate 85 85 89


 


Pulse Rate [   





From Monitor]   


 


Respiratory 16 17 16





Rate   


 


Blood Pressure 116/62 116/62 107/60


 


O2 Sat by Pulse 88 89 91





Oximetry   














  04/18/18 04/18/18 04/18/18





  03:10 03:20 03:30


 


Temperature   


 


Pulse Rate 101 H 89 85


 


Pulse Rate [   





From Monitor]   


 


Respiratory 19 17 19





Rate   


 


Blood Pressure 107/60 107/60 107/60


 


O2 Sat by Pulse 78 L 86 91





Oximetry   














  04/18/18 04/18/18 04/18/18





  03:40 03:45 03:50


 


Temperature  97.3 F L 


 


Pulse Rate 90  81


 


Pulse Rate [   





From Monitor]   


 


Respiratory 19  14





Rate   


 


Blood Pressure 107/60  107/60


 


O2 Sat by Pulse 90  87





Oximetry   














  04/18/18 04/18/18 04/18/18





  04:00 04:10 04:20


 


Temperature  97.6 F 


 


Pulse Rate 86 92 H 86


 


Pulse Rate [ 103 H  





From Monitor]   


 


Respiratory 17 15 20





Rate   


 


Blood Pressure 107/60 116/58 116/58


 


O2 Sat by Pulse 88 87 88





Oximetry   














  04/18/18 04/18/18 04/18/18





  04:30 04:40 04:50


 


Temperature   


 


Pulse Rate 85 81 77


 


Pulse Rate [   





From Monitor]   


 


Respiratory 18 18 16





Rate   


 


Blood Pressure 116/58 116/58 116/58


 


O2 Sat by Pulse 82 L 92 90





Oximetry   














  04/18/18 04/18/18 04/18/18





  05:01 05:10 05:20


 


Temperature   


 


Pulse Rate 84 80 79


 


Pulse Rate [   





From Monitor]   


 


Respiratory 17 18 15





Rate   


 


Blood Pressure 109/58 109/58 109/58


 


O2 Sat by Pulse 91 92 91





Oximetry   














  04/18/18 04/18/18 04/18/18





  05:30 05:40 05:50


 


Temperature   


 


Pulse Rate 78 87 88


 


Pulse Rate [   





From Monitor]   


 


Respiratory 18 17 17





Rate   


 


Blood Pressure 109/58 109/58 109/58


 


O2 Sat by Pulse 89 90 89





Oximetry   














  04/18/18 04/18/18 04/18/18





  06:00 06:10 06:20


 


Temperature   


 


Pulse Rate 71 76 81


 


Pulse Rate [ 103 H  





From Monitor]   


 


Respiratory 16 15 17





Rate   


 


Blood Pressure 103/55 103/55 103/55


 


O2 Sat by Pulse 93 88 92





Oximetry   














  04/18/18 04/18/18 04/18/18





  06:30 06:40 06:50


 


Temperature   


 


Pulse Rate 80 78 84


 


Pulse Rate [   





From Monitor]   


 


Respiratory 17 18 16





Rate   


 


Blood Pressure 103/55 103/55 103/55


 


O2 Sat by Pulse 91 91 93





Oximetry   














  04/18/18 04/18/18 04/18/18





  07:00 07:10 07:20


 


Temperature   


 


Pulse Rate 81 78 87


 


Pulse Rate [   





From Monitor]   


 


Respiratory 16 18 16





Rate   


 


Blood Pressure 111/64 111/64 111/64


 


O2 Sat by Pulse 93 91 94





Oximetry   














  04/18/18 04/18/18 04/18/18





  07:30 07:40 07:50


 


Temperature   


 


Pulse Rate 91 H 106 H 96 H


 


Pulse Rate [   





From Monitor]   


 


Respiratory 17 10 L 17





Rate   


 


Blood Pressure 111/64 111/64 111/64


 


O2 Sat by Pulse 90 96 94





Oximetry   














  04/18/18 04/18/18 04/18/18





  08:00 08:10 08:20


 


Temperature 98 F  


 


Pulse Rate 102 H 84 89


 


Pulse Rate [   





From Monitor]   


 


Respiratory 15 19 16





Rate   


 


Blood Pressure 123/69 123/69 123/69


 


O2 Sat by Pulse 95 96 95





Oximetry   














  04/18/18 04/18/18 04/18/18





  08:30 08:40 08:50


 


Temperature   


 


Pulse Rate 101 H 99 H 99 H


 


Pulse Rate [   





From Monitor]   


 


Respiratory 26 H 17 17





Rate   


 


Blood Pressure 123/69 123/69 123/69


 


O2 Sat by Pulse 94 94 94





Oximetry   














  04/18/18 04/18/18 04/18/18





  09:00 09:10 09:20


 


Temperature   


 


Pulse Rate 115 H 112 H 126 H


 


Pulse Rate [   





From Monitor]   


 


Respiratory 18 24 21





Rate   


 


Blood Pressure 136/76 136/76 132/78


 


O2 Sat by Pulse 90 88 88





Oximetry   














  04/18/18 04/18/18 04/18/18





  09:30 09:40 09:50


 


Temperature   


 


Pulse Rate 100 H 90 98 H


 


Pulse Rate [   





From Monitor]   


 


Respiratory 19 35 H 30 H





Rate   


 


Blood Pressure 132/78 119/74 119/74


 


O2 Sat by Pulse 92 90 89





Oximetry   














  04/18/18 04/18/18 04/18/18





  10:00 10:10 10:20


 


Temperature   


 


Pulse Rate 96 H 83 89


 


Pulse Rate [   





From Monitor]   


 


Respiratory 27 H 22 21





Rate   


 


Blood Pressure 130/70 130/70 130/70


 


O2 Sat by Pulse 90 92 94





Oximetry   














  04/18/18 04/18/18 04/18/18





  10:30 10:40 10:50


 


Temperature   


 


Pulse Rate 90 106 H 102 H


 


Pulse Rate [   





From Monitor]   


 


Respiratory 25 H 19 25 H





Rate   


 


Blood Pressure 130/70 130/70 130/70


 


O2 Sat by Pulse 94 95 98





Oximetry   














  04/18/18 04/18/18 04/18/18





  11:00 11:10 11:20


 


Temperature   


 


Pulse Rate 103 H 101 H 114 H


 


Pulse Rate [   





From Monitor]   


 


Respiratory 32 H 26 H 29 H





Rate   


 


Blood Pressure 121/69 124/69 124/69


 


O2 Sat by Pulse 93 94 92





Oximetry   














  04/18/18 04/18/18 04/18/18





  11:30 11:40 11:50


 


Temperature   


 


Pulse Rate 94 H 100 H 101 H


 


Pulse Rate [   





From Monitor]   


 


Respiratory 31 H 26 H 25 H





Rate   


 


Blood Pressure 124/69 124/69 124/69


 


O2 Sat by Pulse 94 95 92





Oximetry   














  04/18/18 04/18/18





  12:00 12:10


 


Temperature  


 


Pulse Rate 92 H 91 H


 


Pulse Rate [  





From Monitor]  


 


Respiratory 28 H 24





Rate  


 


Blood Pressure 138/76 124/69


 


O2 Sat by Pulse 95 94





Oximetry  











CBC and BMP: 


 04/18/18 04:52





 04/18/18 04:52


ABG, PT/INR, D-dimer: 


PT/INR, D-dimer











PT  15.3 Sec. (12.2-14.9)  H  04/17/18  03:14    


 


INR  1.15  (0.87-1.13)  H  04/17/18  03:14    








Abnormal lab findings: 


 Abnormal Labs











  04/16/18 04/16/18 04/16/18





  01:35 23:29 23:29


 


RBC   2.31 L 


 


Hgb   7.3 L 


 


Hct   21.9 L 


 


RDW   23.9 H 


 


Seg Neuts % (Manual)   92.0 H 


 


Lymphocytes % (Manual)   0 L 


 


Seg Neutrophils # Man   10.1 H 


 


Lymphocytes # (Manual)   0.0 L 


 


PT    15.3 H


 


INR    1.15 H


 


VBG pH   


 


Sodium   


 


Potassium   


 


Chloride   


 


BUN   


 


Glucose   


 


POC Glucose   


 


Calcium   


 


C-Reactive Protein   


 


Albumin   


 


Urine WBC (Auto)  48.0 H  


 


Digoxin   


 


Crossmatch   














  04/16/18 04/16/18 04/17/18





  23:29 23:29 01:47


 


RBC   


 


Hgb   


 


Hct   


 


RDW   


 


Seg Neuts % (Manual)   


 


Lymphocytes % (Manual)   


 


Seg Neutrophils # Man   


 


Lymphocytes # (Manual)   


 


PT   


 


INR   


 


VBG pH   7.449 H 


 


Sodium  132 L  


 


Potassium   


 


Chloride  92.0 L  


 


BUN  22 H  


 


Glucose  263 H  


 


POC Glucose   


 


Calcium   


 


C-Reactive Protein   


 


Albumin  2.9 L  


 


Urine WBC (Auto)   


 


Digoxin    0.7 L


 


Crossmatch   














  04/17/18 04/17/18 04/17/18





  03:14 14:50 15:30


 


RBC   


 


Hgb   


 


Hct   


 


RDW   


 


Seg Neuts % (Manual)   


 


Lymphocytes % (Manual)   


 


Seg Neutrophils # Man   


 


Lymphocytes # (Manual)   


 


PT  15.3 H  


 


INR  1.15 H  


 


VBG pH   


 


Sodium   


 


Potassium   


 


Chloride   


 


BUN   


 


Glucose   


 


POC Glucose    224 H


 


Calcium   


 


C-Reactive Protein   10.80 H 


 


Albumin   


 


Urine WBC (Auto)   


 


Digoxin   


 


Crossmatch   














  04/17/18 04/18/18 04/18/18





  21:59 04:52 04:52


 


RBC   1.85 L 


 


Hgb   5.9 L* 


 


Hct   17.3 L* 


 


RDW   24.0 H 


 


Seg Neuts % (Manual)   


 


Lymphocytes % (Manual)   


 


Seg Neutrophils # Man   


 


Lymphocytes # (Manual)   


 


PT   


 


INR   


 


VBG pH   


 


Sodium   


 


Potassium    3.5 L


 


Chloride   


 


BUN    22 H


 


Glucose    171 H


 


POC Glucose  197 H  


 


Calcium    8.2 L


 


C-Reactive Protein   


 


Albumin   


 


Urine WBC (Auto)   


 


Digoxin   


 


Crossmatch   














  04/18/18 04/18/18





  06:51 07:49


 


RBC  


 


Hgb  


 


Hct  


 


RDW  


 


Seg Neuts % (Manual)  


 


Lymphocytes % (Manual)  


 


Seg Neutrophils # Man  


 


Lymphocytes # (Manual)  


 


PT  


 


INR  


 


VBG pH  


 


Sodium  


 


Potassium  


 


Chloride  


 


BUN  


 


Glucose  


 


POC Glucose   184 H


 


Calcium  


 


C-Reactive Protein  


 


Albumin  


 


Urine WBC (Auto)  


 


Digoxin  


 


Crossmatch  See Detail 











Critical care time in (mins) excluding proc time.: 35


Critical care attestation.: 


If time is entered above; I have spent that time in minutes in the direct care 

of this critically ill patient, excluding procedure time.

## 2018-04-18 NOTE — CONSULTATION
PULMONARY/CRITICAL CARE CONSULTATION



CONSULTING PHYSICIAN:  Raul Iqbal MD.



REASON FOR CONSULTATION:  Critical care management.  Code sepsis.



CHIEF COMPLAINT AND HISTORY OF PRESENT ILLNESS:  The patient is a 62-year-old

 female with past medical history perhaps most significant for a

nonhealing pelvic wound related to treatment for endometrial cancer and status

post radiation therapy as part of her palliative treatment, which is believed to

have affected wound healing and now status post ____ and admission at an

outlying hospital at [facility] for a total of 122 days,

so has been in the healthcare system and hospital-associated infections

certainly a possibility.  She was brought in from the nursing home with fevers,

tachycardia and the wound was draining pus.  She was alert and oriented.  She

apparently went into atrial fibrillation with a rapid ventricular response.  She

does have chronic atrial fibrillation.  She is on beta blockers and digoxin for

her treatment.  She had complained of 3 days of diarrhea prior to presentation

and Prior to her decompensation.  In the Emergency Room, she was given IV

fluids, antibiotics were started, sepsis protocol was initiated.  She was put in

contact isolation secondary to her history of MRSA and I believe a multidrug

resistant E. coli. also.  She was ultimately transferred to the Intensive Care

Unit, I believe, for the sepsis as well as for the atrial fibrillation with a

rapid ventricular response.  When I stopped by to see her, she was feeling

better.  She was lying flat in bed.  Denied any acute uncontrolled pain.  She

denies any history of tobacco use or abuse whatsoever.  She admits to a history

of obstructive sleep apnea and is on CPAP for that problem.  She does not

remember any particular altered mental status, dizziness that may have been

associated with hypotension.  She tells me she was told she was brought to the

hospital due to anemia and hemoglobin of 6.8.  This is much of the history of

presentation as I have.



PAST MEDICAL HISTORY:  Significant for hypertension, congestive heart failure,

diabetes, history of narcolepsy, history of, I believe, endometrial cancer,

recent history of a left femur fracture and history of congestive heart failure.

 She is obese to morbidly obese.  History of anemia.



PAST SURGICAL HISTORY:  She has had some pelvic surgery, I believe, in relation

to her endometrial cancer.



MEDICATIONS:  She was on at the time I stopped by to see were reviewed,

pertinent medications include the following:  She was on Zofran 4 mg IV q. 8

hours p.r.n. nausea and vomiting, Zosyn 4.5 grams IV q. 6 hours.  She was

scheduled to receive 2 grams of vancomycin that was held secondary to her report

of renal failure in association with the last time she was given vancomycin. 

She received digoxin in the Emergency Room.  She was on an esmolol drip for a

little while in the Emergency Room.  She received Flagyl in the Emergency Room.



ALLERGIES:  VANCOMYCIN, according to her, gave her renal failure.



DIET:  Morbidly obese.  Acute weight loss or gain history is unknown.



FAMILY AND SOCIAL HISTORY:  Lives in a nursing home at this point.  She is

.  Denies tobacco use or abuse, alcohol, illicit drug use or abuse

history is unknown.



FAMILY AND SOCIAL HISTORY:  Otherwise noncontributory.



REVIEW OF SYSTEMS:  No loss of consciousness.  No new onset seizures.  No new

onset focal weakness.  She has had diarrhea; however, no gross hematochezia or

melena, no gross hematuria, no dysuria.  She had a tachycardia without

palpitations.  Complete 13 systems review of systems obtained.  Pertinent

positives and/or negatives as in body of history above, otherwise they are

noncontributory.



PHYSICAL EXAMINATION:

VITAL SIGNS:  At presentation in the Emergency Room, she had a fever 103.0

degrees Fahrenheit with a pulse of 156, respiratory rate of 18, blood pressure

132/65, oxygen sats were 96%, inspired oxygen concentration was not recorded,

blood pressure was as low as 72/45 at a point.  Right now, blood pressure is

115/58 and 98 degrees Fahrenheit axillary.

GENERAL:  She is a morbidly obese  female, looks middle-aged.  She has

an obvious swelling around the right infraauricular region.  This is about the

size of an egg with a little bit of a bluish tint that she said she has had

pretty much all her life.  Talking to me in full sentences without significant

respiratory distress.

HEAD, EYES, EARS, NOSE AND THROAT:  She is anicteric, no conjunctival erythema. 

Oropharynx is a Mallampati #4.  Oropharynx is moist.  No gross jugular venous

distention, no thyromegaly, no palpable lymph nodes in the supraclavicular or

submandibular lymph node chains.  She does have the swelling that I described,

which does not appear to be a lymph node, has the consistency of a lipoma.

LUNGS:  Auscultation of both lung fields unremarkable.  Lungs are clear

bilaterally, slightly diminished bibasilar air entry, but no wheezing.

HEART:  Heart sounds 1 and 2 are heard at the time of my evaluation.  Irregular

rate and rhythm, but the pulse was controlled, the pulse in the 90s.  No rubs or

murmurs.

ABDOMEN:  Full, soft.  Bowel sounds are positive, nontender, nondistended, but

certainly obese, lot of central obesity.  No obvious hepatosplenomegaly.

EXTREMITIES:  Without overt digital clubbing, cyanosis, or pedal edema.  Warm to

touch.  She has SCDs of both lower extremities.

NEUROLOGIC:  The pupils were equal, round, about 3-4 mm, reactive to light. 

Extraocular muscle movements were intact.  She had spontaneous movements to all

4 extremities.  The skin was of normal turgor.  She has the area of the pelvic

wound that I did not examine.  Otherwise, no decubitus ulcers are reported.



LABORATORY DATA:  From my review are as follows:  White cell count 11,000,

hemoglobin 7.3, hematocrit 21.9, platelet count 285, 5% band forms reported. 

INR was 1.15.  Venous blood gas showed a pH of 7.45.  Serum sodium was 132,

potassium 4.0, chloride 92, bicarbonate 25, BUN 22, creatinine 0.9, glucose was

263.  Lactic acid level within normal limits at 1.6.  Liver function tests

essentially within normal limits.  Albumin was 2.9.  Urinalysis showed moderate

leukocyte esterase, 48 white cells per high power field.  Digoxin level was low

at 0.7.  Blood cultures have been drawn, no growth to date.  I am unable to pull

up the radiographic images unfortunately.  The chest x-ray I have reviewed

report on; it is described as a central venous line, right IJ line is in place,

tip is in the proximal SVC, no pneumothorax.  Left lung is poorly visualized

secondary to the rotation.  A CT of the abdomen and pelvis was also done.  It

was described as a complex abscess or multiple abscess with a maximum dimension

of 13 x 10 cm lower anterior abdominal wall and right lower quadrant.  Liver is

enlarged.  No bowel obstruction.



ASSESSMENT:

1. Severe sepsis with shock.

2. Possible superinfection of complex pelvic wound.

3. Atrial fibrillation with rapid ventricular response.

4. Diarrhea with the possibility of Clostridium difficile colitis infection.

5. Anemia, normocytic, possibly of chronic disease.

6. Hyponatremia.

7. History of diabetes.

8. Possible urinary tract infection.

9. Obstructive sleep apnea.

10. Morbid obesity.

11. History of methicillin resistant Staphylococcus aureus infection.



PLAN:  Continue volume resuscitation at this point.  The mean arterial pressures

are acceptable now.  We will continue the gentle volume resuscitation, however. 

Vasopressors will be given as necessary to keep mean arterial pressures greater

than or equal to about 65 mmHg if she is not responsive to volume down the line.

 Infectious Disease consultation has been placed to assist with management of

this complex abdominal infection in this lady who is status post a long time

hospital admission and at risk for nosocomial infections.  Glycemic control will

be via sliding scale insulin, plus or minus long acting insulin therapy.  It is

unclear if she was on any long-acting insulin on the outpatient level.  I have

asked her to call her  to bring in her CPAP machine, so that she uses her

noninvasive ventilation for obstructive sleep apnea at bedtime.  Oxygen will be

supplemented as necessary to keep sats greater than or equal to about 90%. 

Aspiration precautions will be maintained.  She will be placed on GI prophylaxis

as well as DVT prophylaxis.  I have a short leash to evaluate for venous

thromboembolic phenomenon as a cause of her fever of unknown origin.  Flu and

pneumonia vaccination will be per protocol.



Thank you very much for the consult Dr. Iqbal.  We will follow along and make

further recommendations as picture progresses/becomes clearer.  She is

critically ill with likely septic shock and at high risk for further

deterioration including death.



At this time, I spent about 30-35 minutes of critical care time without overlap

excluding any procedural time that may be necessary.





DD: 04/17/2018 13:30

DT: 04/18/2018 00:27

JOB# [id number]  [id number]

AJM/EDYTA

## 2020-06-03 NOTE — CONSULTATION
History of Present Illness


Consult date: 04/17/18


Requesting physician: ANGIE NORRIS


Reason for consult: other (Sepsis Syndrome; SIGIFREDO; A-fib with RVR)


History of present illness: 





PULMONARY/CCM CONSULT NOTE (Full dictation # [id number])





Please see dictated notes for full details





Medications and Allergies


 Allergies











Allergy/AdvReac Type Severity Reaction Status Date / Time


 


No Known Allergies Allergy   Unverified 04/16/18 23:17











Active Meds: 


Active Medications





Acetaminophen (Tylenol)  650 mg PO Q4H PRN


   PRN Reason: For Pain/Fever/Headache


Digoxin (Lanoxin)  0.25 mg IV ONCE NR


   Stop: 04/17/18 12:00


Sodium Chloride (Nacl 0.9% 1000 Ml)  1,000 mls @ 75 mls/hr IV AS DIRECT DAVID


   Last Admin: 04/17/18 04:54 Dose:  75 mls/hr


Piperacillin Sod/Tazobactam Sod (Zosyn/Ns 4.5gm/100ml)  4.5 gm in 100 mls @ 200 

mls/hr IV Q8HR DAVID


Ondansetron HCl (Zofran)  4 mg IV Q8H PRN


   PRN Reason: Nausea And Vomiting


Vancomycin HCl (Vancomycin Pharmacy To Dose)  1 each IV PKCONSULT DAVID; Protocol











Physical Examination


Vital signs: 


 Vital Signs











Temp Pulse Resp BP Pulse Ox


 


 103.0 F H  156 H  18   132/65   96 


 


 04/16/18 22:59  04/16/18 22:59  04/16/18 22:59  04/16/18 22:59  04/16/18 22:59














Results





- Laboratory Findings


CBC and BMP: 


 04/16/18 23:29





 04/16/18 23:29


PT/INR, D-dimer











PT  15.3 Sec. (12.2-14.9)  H  04/17/18  03:14    


 


INR  1.15  (0.87-1.13)  H  04/17/18  03:14    








Abnormal lab findings: 


 Abnormal Labs











  04/16/18 04/16/18 04/16/18





  01:35 23:29 23:29


 


RBC   2.31 L 


 


Hgb   7.3 L 


 


Hct   21.9 L 


 


RDW   23.9 H 


 


Seg Neuts % (Manual)   92.0 H 


 


Lymphocytes % (Manual)   0 L 


 


Seg Neutrophils # Man   10.1 H 


 


Lymphocytes # (Manual)   0.0 L 


 


PT    15.3 H


 


INR    1.15 H


 


VBG pH   


 


Sodium   


 


Chloride   


 


BUN   


 


Glucose   


 


Albumin   


 


Urine WBC (Auto)  48.0 H  


 


Digoxin   














  04/16/18 04/16/18 04/17/18





  23:29 23:29 01:47


 


RBC   


 


Hgb   


 


Hct   


 


RDW   


 


Seg Neuts % (Manual)   


 


Lymphocytes % (Manual)   


 


Seg Neutrophils # Man   


 


Lymphocytes # (Manual)   


 


PT   


 


INR   


 


VBG pH   7.449 H 


 


Sodium  132 L  


 


Chloride  92.0 L  


 


BUN  22 H  


 


Glucose  263 H  


 


Albumin  2.9 L  


 


Urine WBC (Auto)   


 


Digoxin    0.7 L














  04/17/18





  03:14


 


RBC 


 


Hgb 


 


Hct 


 


RDW 


 


Seg Neuts % (Manual) 


 


Lymphocytes % (Manual) 


 


Seg Neutrophils # Man 


 


Lymphocytes # (Manual) 


 


PT  15.3 H


 


INR  1.15 H


 


VBG pH 


 


Sodium 


 


Chloride 


 


BUN 


 


Glucose 


 


Albumin 


 


Urine WBC (Auto) 


 


Digoxin [General Appearance - Well Developed] : well developed [Well Groomed] : well groomed [Normal Appearance] : normal appearance [No Deformities] : no deformities [General Appearance - Well Nourished] : well nourished [Normal Conjunctiva] : the conjunctiva exhibited no abnormalities [General Appearance - In No Acute Distress] : no acute distress [Eyelids - No Xanthelasma] : the eyelids demonstrated no xanthelasmas [Normal Oral Mucosa] : normal oral mucosa [No Oral Cyanosis] : no oral cyanosis [No Oral Pallor] : no oral pallor [Normal Jugular Venous A Waves Present] : normal jugular venous A waves present [Normal Jugular Venous V Waves Present] : normal jugular venous V waves present [No Jugular Venous Ca A Waves] : no jugular venous ca A waves [Respiration, Rhythm And Depth] : normal respiratory rhythm and effort [Exaggerated Use Of Accessory Muscles For Inspiration] : no accessory muscle use [Heart Rate And Rhythm] : heart rate and rhythm were normal [Auscultation Breath Sounds / Voice Sounds] : lungs were clear to auscultation bilaterally [Murmurs] : no murmurs present [Heart Sounds] : normal S1 and S2 [Abdomen Soft] : soft [Abdomen Tenderness] : non-tender [Abdomen Mass (___ Cm)] : no abdominal mass palpated [Abnormal Walk] : normal gait [Gait - Sufficient For Exercise Testing] : the gait was sufficient for exercise testing [FreeTextEntry1] : reproducible pain left hip area  [Skin Color & Pigmentation] : normal skin color and pigmentation [] : no rash [No Venous Stasis] : no venous stasis [No Skin Ulcers] : no skin ulcer [Skin Lesions] : no skin lesions [No Xanthoma] : no  xanthoma was observed [Oriented To Time, Place, And Person] : oriented to person, place, and time [Mood] : the mood was normal [No Anxiety] : not feeling anxious [Affect] : the affect was normal